# Patient Record
Sex: FEMALE | Race: WHITE | NOT HISPANIC OR LATINO | Employment: PART TIME | URBAN - METROPOLITAN AREA
[De-identification: names, ages, dates, MRNs, and addresses within clinical notes are randomized per-mention and may not be internally consistent; named-entity substitution may affect disease eponyms.]

---

## 2017-12-04 ENCOUNTER — OFFICE VISIT (OUTPATIENT)
Dept: OBSTETRICS AND GYNECOLOGY | Facility: CLINIC | Age: 47
End: 2017-12-04

## 2017-12-04 VITALS
BODY MASS INDEX: 32.65 KG/M2 | WEIGHT: 208 LBS | HEIGHT: 67 IN | DIASTOLIC BLOOD PRESSURE: 78 MMHG | SYSTOLIC BLOOD PRESSURE: 120 MMHG

## 2017-12-04 DIAGNOSIS — Z01.419 ENCOUNTER FOR GYNECOLOGICAL EXAMINATION WITHOUT ABNORMAL FINDING: Primary | ICD-10-CM

## 2017-12-04 PROCEDURE — 99396 PREV VISIT EST AGE 40-64: CPT | Performed by: OBSTETRICS & GYNECOLOGY

## 2017-12-04 RX ORDER — ATORVASTATIN CALCIUM 10 MG/1
TABLET, FILM COATED ORAL
Refills: 3 | COMMUNITY
Start: 2017-11-21 | End: 2022-05-03

## 2017-12-04 RX ORDER — METOPROLOL SUCCINATE 25 MG/1
25 TABLET, EXTENDED RELEASE ORAL
COMMUNITY
Start: 2017-11-30 | End: 2018-11-30

## 2017-12-04 NOTE — PROGRESS NOTES
Subjective     Johanne More is a 47 y.o. female for annual gyn exam    History of Present Illness   47-year-old white female  2 para 2 presents for annual exam.  Her menstrual cycles are regular.  She uses her 's vasectomy for contraception.  Denies menopausal symptoms.  Her diabetes mellitus is well controlled with an insulin pump.  She received a mammogram in the office today.  She has no complaints.      The following portions of the patient's history were reviewed and updated as appropriate: allergies, current medications, past family history, past medical history, past social history, past surgical history and problem list.      Review of Systems   Constitutional: Negative for activity change, appetite change, fatigue and unexpected weight change.   HENT: Negative.    Eyes: Negative.    Respiratory: Negative.    Cardiovascular: Negative.    Gastrointestinal: Negative for abdominal distention, abdominal pain, anal bleeding, constipation, diarrhea, nausea and vomiting.   Endocrine: Negative for cold intolerance, heat intolerance, polydipsia, polyphagia and polyuria.   Genitourinary: Negative for difficulty urinating, dysuria, flank pain, frequency, hematuria and urgency.   Musculoskeletal: Negative.    Skin: Negative.    Allergic/Immunologic: Negative.    Neurological: Negative.    Hematological: Negative.    Psychiatric/Behavioral: Negative.        Objective     Physical Exam   Constitutional: She is oriented to person, place, and time. Vital signs are normal. She appears well-developed and well-nourished. She is cooperative.   HENT:   Head: Normocephalic.   Eyes: Conjunctivae are normal. Pupils are equal, round, and reactive to light.   Neck: Normal range of motion. Neck supple. No tracheal deviation present. No thyromegaly present.   Cardiovascular: Normal rate, regular rhythm and normal heart sounds.  Exam reveals no gallop and no friction rub.    No murmur heard.  Pulmonary/Chest: Effort  normal and breath sounds normal. No respiratory distress. She has no wheezes. Right breast exhibits no inverted nipple, no mass, no nipple discharge, no skin change and no tenderness. Left breast exhibits no inverted nipple, no mass, no nipple discharge, no skin change and no tenderness. Breasts are symmetrical. There is no breast swelling.   Abdominal: Soft. Bowel sounds are normal. She exhibits no distension, no ascites and no mass. There is no hepatosplenomegaly. There is no tenderness. There is no rebound, no guarding and no CVA tenderness. No hernia. Hernia confirmed negative in the right inguinal area and confirmed negative in the left inguinal area.   Genitourinary: Rectal exam shows no fissure, no mass, no tenderness and anal tone normal. Pelvic exam was performed with patient supine. No labial fusion. There is no rash, tenderness, lesion or injury on the right labia. There is no rash, tenderness, lesion or injury on the left labia. Uterus is not deviated, not enlarged, not fixed and not tender. Cervix exhibits no motion tenderness, no discharge and no friability. Right adnexum displays no mass, no tenderness and no fullness. Left adnexum displays no mass, no tenderness and no fullness. There is bleeding in the vagina. No erythema or tenderness in the vagina. No foreign body in the vagina. No signs of injury around the vagina. No vaginal discharge found.   Musculoskeletal: Normal range of motion. She exhibits no edema or deformity.   Lymphadenopathy:     She has no cervical adenopathy.        Right: No inguinal adenopathy present.        Left: No inguinal adenopathy present.   Neurological: She is alert and oriented to person, place, and time. She has normal reflexes. No cranial nerve deficit. Coordination normal.   Skin: Skin is warm and dry. No rash noted. No erythema.   Psychiatric: She has a normal mood and affect. Her behavior is normal.         Johanne was seen today for gynecologic exam.    Diagnoses  and all orders for this visit:    Encounter for gynecological examination without abnormal finding  -     Pap IG, Rfx HPV ASCU - ThinPrep Vial, Cervix    The patient is doing well.  We discussed signs and symptoms of the menopause.  We discussed nutritional supplementation and weightbearing exercise for bone health.  Annual exams were recommended.

## 2017-12-06 LAB
CONV .: NORMAL
CYTOLOGIST CVX/VAG CYTO: NORMAL
CYTOLOGY CVX/VAG DOC THIN PREP: NORMAL
DX ICD CODE: NORMAL
HIV 1 & 2 AB SER-IMP: NORMAL
OTHER STN SPEC: NORMAL
PATH REPORT.FINAL DX SPEC: NORMAL
STAT OF ADQ CVX/VAG CYTO-IMP: NORMAL

## 2018-12-19 ENCOUNTER — OFFICE VISIT (OUTPATIENT)
Dept: OBSTETRICS AND GYNECOLOGY | Facility: CLINIC | Age: 48
End: 2018-12-19

## 2018-12-19 VITALS
BODY MASS INDEX: 34.37 KG/M2 | HEIGHT: 67 IN | SYSTOLIC BLOOD PRESSURE: 100 MMHG | DIASTOLIC BLOOD PRESSURE: 70 MMHG | WEIGHT: 219 LBS

## 2018-12-19 DIAGNOSIS — Z01.419 ENCOUNTER FOR GYNECOLOGICAL EXAMINATION WITHOUT ABNORMAL FINDING: Primary | ICD-10-CM

## 2018-12-19 PROCEDURE — 99396 PREV VISIT EST AGE 40-64: CPT | Performed by: OBSTETRICS & GYNECOLOGY

## 2018-12-19 RX ORDER — TRETINOIN 10 MG/1
CAPSULE ORAL 2 TIMES DAILY
COMMUNITY
End: 2020-07-29

## 2018-12-19 NOTE — PROGRESS NOTES
Subjective    Johanne More is a 48 y.o. female for annual gyn exam    History of Present Illness   48-year-old multiparous white female presents for routine gynecologic exam.  Her menstrual cycles have become somewhat irregular.  The last 4 cycles were 3 weeks apart and then she had prolonged amenorrhea and started bleeding again yesterday.  She denies vasomotor symptoms.  She uses her 's vasectomy for contraception.  She has yet to obtain a screening mammogram this year.  Her thyroid function was borderline low at last check but she has otherwise been asymptomatic.      The following portions of the patient's history were reviewed and updated as appropriate: allergies, current medications, past family history, past medical history, past social history, past surgical history and problem list.      Review of Systems   Constitutional: Negative for activity change, appetite change, fatigue and unexpected weight change.   HENT: Negative.    Eyes: Negative.    Respiratory: Negative.    Cardiovascular: Negative.    Gastrointestinal: Negative for abdominal distention, abdominal pain, anal bleeding, constipation, diarrhea, nausea and vomiting.   Endocrine: Negative for cold intolerance, heat intolerance, polydipsia, polyphagia and polyuria.   Genitourinary: Positive for menstrual problem and vaginal bleeding. Negative for difficulty urinating, dysuria, flank pain, frequency, hematuria, pelvic pain, urgency and vaginal discharge.   Musculoskeletal: Negative.    Skin: Negative.    Allergic/Immunologic: Negative.    Neurological: Negative.    Hematological: Negative.    Psychiatric/Behavioral: Negative.            Physical Exam   Constitutional: She is oriented to person, place, and time. Vital signs are normal. She appears well-developed and well-nourished. She is cooperative.   HENT:   Head: Normocephalic.   Eyes: Conjunctivae are normal. Pupils are equal, round, and reactive to light.   Neck: Normal range of  motion. Neck supple. No tracheal deviation present. No thyromegaly present.   Cardiovascular: Normal rate, regular rhythm and normal heart sounds. Exam reveals no gallop and no friction rub.   No murmur heard.  Pulmonary/Chest: Effort normal and breath sounds normal. No respiratory distress. She has no wheezes. Right breast exhibits no inverted nipple, no mass, no nipple discharge, no skin change and no tenderness. Left breast exhibits no inverted nipple, no mass, no nipple discharge, no skin change and no tenderness. Breasts are symmetrical. There is no breast swelling.   Abdominal: Soft. Bowel sounds are normal. She exhibits no distension, no ascites and no mass. There is no hepatosplenomegaly. There is no tenderness. There is no rebound, no guarding and no CVA tenderness. No hernia. Hernia confirmed negative in the right inguinal area and confirmed negative in the left inguinal area.   Genitourinary: Rectal exam shows no fissure, no mass, no tenderness and anal tone normal. Pelvic exam was performed with patient supine. No labial fusion. There is no rash, tenderness, lesion or injury on the right labia. There is no rash, tenderness, lesion or injury on the left labia. Uterus is not deviated, not enlarged, not fixed and not tender. Cervix exhibits no motion tenderness, no discharge and no friability. Right adnexum displays no mass, no tenderness and no fullness. Left adnexum displays no mass, no tenderness and no fullness. There is bleeding in the vagina. No erythema or tenderness in the vagina. No foreign body in the vagina. No signs of injury around the vagina. No vaginal discharge found.   Musculoskeletal: Normal range of motion. She exhibits no edema or deformity.   Lymphadenopathy:     She has no cervical adenopathy.        Right: No inguinal adenopathy present.        Left: No inguinal adenopathy present.   Neurological: She is alert and oriented to person, place, and time. She has normal reflexes. No  cranial nerve deficit. Coordination normal.   Skin: Skin is warm and dry. No rash noted. No erythema.   Psychiatric: She has a normal mood and affect. Her behavior is normal.         Johanne was seen today for gynecologic exam.    Diagnoses and all orders for this visit:    Encounter for gynecological examination without abnormal finding  -     Pap IG, Rfx HPV ASCU     we discussed her menstrual cycles and its association with her thyroid function.  There is also a possibility of the start of menopause.  We discussed other signs and symptoms of the menopause.  The patient has a follow-up appointment with her endocrinologist regarding her thyroid function.  I encouraged a healthy diet and regular exercise.  She will keep a menstrual calendar and let me know the results of her thyroid function tests.  Annual exams were recommended.  She will schedule a screening mammogram at her convenience.

## 2020-07-23 ENCOUNTER — TELEPHONE (OUTPATIENT)
Dept: OBSTETRICS AND GYNECOLOGY | Facility: CLINIC | Age: 50
End: 2020-07-23

## 2020-07-23 NOTE — TELEPHONE ENCOUNTER
Patient states that she is still bleeding. She is concerned because she seems to be passing more clots than she is used to and would like to know if this is something that she needs to come in to be seen for. Please advise?

## 2020-07-24 NOTE — TELEPHONE ENCOUNTER
Dr. Dhillon, I believe this is being charted on the wrong pt. This patient called about bleeding....please advise?

## 2020-07-24 NOTE — TELEPHONE ENCOUNTER
I discussed the EKG results with the patient and she is totally asymptomatic and denies any adverse cardiac history.  She does have mild hypertension that is controlled with medication.  I recommended we repeat the EKG preoperatively and if there are issues we can have cardiology evaluate.

## 2020-07-27 NOTE — TELEPHONE ENCOUNTER
Should states her menses have become quite irregular and she most recently has been bleeding off and on for 3 weeks.  She denies menopausal symptoms.  She has not been seen since the end of 2018.  I recommend she make an appointment for further evaluation.

## 2020-07-29 ENCOUNTER — OFFICE VISIT (OUTPATIENT)
Dept: OBSTETRICS AND GYNECOLOGY | Facility: CLINIC | Age: 50
End: 2020-07-29

## 2020-07-29 VITALS
SYSTOLIC BLOOD PRESSURE: 100 MMHG | HEIGHT: 67 IN | BODY MASS INDEX: 35 KG/M2 | DIASTOLIC BLOOD PRESSURE: 66 MMHG | WEIGHT: 223 LBS

## 2020-07-29 DIAGNOSIS — N93.8 DUB (DYSFUNCTIONAL UTERINE BLEEDING): Primary | ICD-10-CM

## 2020-07-29 PROCEDURE — 99213 OFFICE O/P EST LOW 20 MIN: CPT | Performed by: OBSTETRICS & GYNECOLOGY

## 2020-07-29 RX ORDER — MONTELUKAST SODIUM 10 MG/1
10 TABLET ORAL DAILY
COMMUNITY
Start: 2020-07-10

## 2020-07-29 RX ORDER — INSULIN GLARGINE 100 [IU]/ML
10 INJECTION, SOLUTION SUBCUTANEOUS
COMMUNITY

## 2020-07-29 RX ORDER — AZELASTINE 1 MG/ML
2 SPRAY, METERED NASAL
COMMUNITY
Start: 2016-02-04 | End: 2022-05-03

## 2020-07-29 RX ORDER — METOPROLOL SUCCINATE 25 MG/1
25 TABLET, EXTENDED RELEASE ORAL DAILY
COMMUNITY
Start: 2020-05-26 | End: 2021-05-26

## 2020-07-29 RX ORDER — GABAPENTIN 300 MG/1
CAPSULE ORAL
COMMUNITY
Start: 2020-04-16 | End: 2022-05-03

## 2020-07-29 RX ORDER — CETIRIZINE HYDROCHLORIDE 10 MG/1
10 TABLET ORAL DAILY
COMMUNITY
Start: 2019-12-22 | End: 2020-12-21

## 2020-07-29 RX ORDER — BUSPIRONE HYDROCHLORIDE 10 MG/1
10 TABLET ORAL
COMMUNITY
Start: 2020-07-10 | End: 2021-07-10

## 2020-07-29 NOTE — PROGRESS NOTES
Subjective   Johanne More is a 50 y.o. female with persistent vaginal bleeding    History of Present Illness    50-year-old multiparous white female presents with 3 weeks of persistent vaginal bleeding.  Her last normal menstrual period was in April.  She had amenorrhea until the beginning of July at which time she had what appeared to be a normal menstrual cycle.  She denies vasomotor symptoms.  The normal bleeding lasted 7 days.  She then had intermittent spotting with occasional heavy vaginal bleeding that has persisted until yesterday.  She denies any pain or passage of tissue or clots.  She recently had her thyroid function tested and it was well controlled.    The following portions of the patient's history were reviewed and updated as appropriate: allergies, current medications, past family history, past medical history, past social history, past surgical history and problem list.    Review of Systems   Genitourinary: Positive for menstrual problem and vaginal bleeding. Negative for pelvic pain and vaginal discharge.       Objective   Physical Exam   The patient is alert and conversant and in no acute distress.  The abdomen is soft, obese and nontender.  No masses are palpable.  The vulva and perineum are without lesion.  The vagina is without bleeding or discharge.  The cervix is without lesion and nontender to motion.  The uterus is anteverted and normal size, shape, and contour.  The adnexa are without mass or tenderness.    Assessment/Plan   Johanne was seen today for follow-up.    Diagnoses and all orders for this visit:    DUB (dysfunctional uterine bleeding)  -     progesterone (PROMETRIUM) 200 MG capsule; Take 1 capsule by mouth Daily for 10 days.    We discussed her symptoms and the findings.  The patient did have a reaction to Megace involving oral blisters.  We discussed the pros and cons of trying oral progesterone and the patient agrees to try the natural progesterone product.  She will keep  a menstrual calendar.  She is overdue for her annual exam and mammogram.  She will schedule these in the near future and return for follow-up.

## 2020-09-30 ENCOUNTER — PROCEDURE VISIT (OUTPATIENT)
Dept: OBSTETRICS AND GYNECOLOGY | Facility: CLINIC | Age: 50
End: 2020-09-30

## 2020-09-30 ENCOUNTER — OFFICE VISIT (OUTPATIENT)
Dept: OBSTETRICS AND GYNECOLOGY | Facility: CLINIC | Age: 50
End: 2020-09-30

## 2020-09-30 ENCOUNTER — APPOINTMENT (OUTPATIENT)
Dept: WOMENS IMAGING | Facility: HOSPITAL | Age: 50
End: 2020-09-30

## 2020-09-30 VITALS
HEIGHT: 67 IN | SYSTOLIC BLOOD PRESSURE: 91 MMHG | WEIGHT: 214 LBS | DIASTOLIC BLOOD PRESSURE: 70 MMHG | BODY MASS INDEX: 33.59 KG/M2

## 2020-09-30 DIAGNOSIS — Z01.419 ENCOUNTER FOR GYNECOLOGICAL EXAMINATION WITHOUT ABNORMAL FINDING: Primary | ICD-10-CM

## 2020-09-30 DIAGNOSIS — Z12.31 VISIT FOR SCREENING MAMMOGRAM: Primary | ICD-10-CM

## 2020-09-30 DIAGNOSIS — B37.2 CANDIDAL DERMATITIS: ICD-10-CM

## 2020-09-30 PROCEDURE — 99396 PREV VISIT EST AGE 40-64: CPT | Performed by: OBSTETRICS & GYNECOLOGY

## 2020-09-30 PROCEDURE — 77063 BREAST TOMOSYNTHESIS BI: CPT | Performed by: RADIOLOGY

## 2020-09-30 PROCEDURE — 77067 SCR MAMMO BI INCL CAD: CPT | Performed by: RADIOLOGY

## 2020-09-30 RX ORDER — SOLIFENACIN SUCCINATE 10 MG/1
TABLET, FILM COATED ORAL
COMMUNITY
Start: 2020-05-26 | End: 2022-05-03

## 2020-09-30 RX ORDER — HYDROXYCHLOROQUINE SULFATE 200 MG/1
TABLET, FILM COATED ORAL
COMMUNITY
Start: 2020-08-11 | End: 2022-05-03

## 2020-09-30 NOTE — PROGRESS NOTES
Subjective    Johanne More is a 50 y.o. female for annual gyn exam    History of Present Illness   50-year-old white female  2 para 2 presents for routine gynecologic exam.  Her menstrual cycles have been fairly regular until July when she had a recurrent episode of dysfunctional uterine bleeding.  This responded to a 10-day course of Provera.  Her last menstrual period last week came on time and was normal and self-limited.  She denies any symptoms of the menopause such as hot flashes or night sweats.  Her diabetes is well controlled.  She received a mammogram in the office today.  There is no family history of breast or colon cancer.  She states she has been actively swimming and has developed itching of the groin.  She has not seen or felt a rash.  She has tried over-the-counter antifungal's with partial relief.  She has no other complaints.      The following portions of the patient's history were reviewed and updated as appropriate: allergies, current medications, past family history, past medical history, past social history, past surgical history and problem list.      Review of Systems   Constitutional: Negative for activity change, appetite change, fatigue and unexpected weight change.   HENT: Negative.    Eyes: Negative.    Respiratory: Negative.    Cardiovascular: Negative.    Gastrointestinal: Negative for abdominal distention, abdominal pain, anal bleeding, constipation, diarrhea, nausea and vomiting.   Endocrine: Negative for cold intolerance, heat intolerance, polydipsia, polyphagia and polyuria.   Genitourinary: Negative for difficulty urinating, dysuria, flank pain, frequency, hematuria, pelvic pain, urgency, vaginal bleeding and vaginal discharge.   Musculoskeletal: Negative.    Skin: Negative.         Itching of both groins   Allergic/Immunologic: Negative.    Neurological: Negative.    Hematological: Negative.    Psychiatric/Behavioral: Negative.            Physical  Exam  Constitutional:       Appearance: She is well-developed.   HENT:      Head: Normocephalic.   Eyes:      Conjunctiva/sclera: Conjunctivae normal.      Pupils: Pupils are equal, round, and reactive to light.   Neck:      Musculoskeletal: Normal range of motion and neck supple.      Thyroid: No thyromegaly.      Trachea: No tracheal deviation.   Cardiovascular:      Rate and Rhythm: Normal rate and regular rhythm.      Heart sounds: Normal heart sounds. No murmur. No friction rub. No gallop.    Pulmonary:      Effort: Pulmonary effort is normal. No respiratory distress.      Breath sounds: Normal breath sounds. No wheezing.   Chest:      Breasts: Breasts are symmetrical.         Right: No inverted nipple, mass, nipple discharge, skin change or tenderness.         Left: No inverted nipple, mass, nipple discharge, skin change or tenderness.   Abdominal:      General: Bowel sounds are normal. There is no distension.      Palpations: Abdomen is soft. There is no mass.      Tenderness: There is no abdominal tenderness. There is no guarding or rebound.      Hernia: No hernia is present. There is no hernia in the left inguinal area.   Genitourinary:     Exam position: Supine.      Labia:         Right: No rash, tenderness, lesion or injury.         Left: No rash, tenderness, lesion or injury.       Vagina: No signs of injury and foreign body. No vaginal discharge, erythema, tenderness or bleeding.      Cervix: No cervical motion tenderness, discharge or friability.      Uterus: Not deviated, not enlarged, not fixed and not tender.       Adnexa:         Right: No mass, tenderness or fullness.          Left: No mass, tenderness or fullness.        Rectum: No mass, tenderness or anal fissure. Normal anal tone.      Comments: There is mild erythema of both groins without rash or lesion.  Musculoskeletal: Normal range of motion.         General: No deformity.   Lymphadenopathy:      Cervical: No cervical adenopathy.    Skin:     General: Skin is warm and dry.      Findings: No erythema or rash.   Neurological:      Mental Status: She is alert and oriented to person, place, and time.      Cranial Nerves: No cranial nerve deficit.      Coordination: Coordination normal.      Deep Tendon Reflexes: Reflexes are normal and symmetric.   Psychiatric:         Behavior: Behavior normal. Behavior is cooperative.           Johanne was seen today for gynecologic exam.    Diagnoses and all orders for this visit:    Encounter for gynecological examination without abnormal finding  -     Pap IG, Rfx HPV ASCU    Candidal dermatitis  -     nystatin-triamcinolone (MYCOLOG II) 120348-3.1 UNIT/GM-% cream; Apply  topically to the appropriate area as directed 2 (Two) Times a Day.    The patient is doing well.  We discussed signs and symptoms of the menopause.  She will try the topical Mytrex for her itching.  I encouraged a healthy diet and regular exercise.  Annual exams and mammograms were recommended.

## 2020-10-05 LAB
CONV .: NORMAL
CYTOLOGIST CVX/VAG CYTO: NORMAL
CYTOLOGY CVX/VAG DOC CYTO: NORMAL
CYTOLOGY CVX/VAG DOC THIN PREP: NORMAL
DX ICD CODE: NORMAL
HIV 1 & 2 AB SER-IMP: NORMAL
OTHER STN SPEC: NORMAL
STAT OF ADQ CVX/VAG CYTO-IMP: NORMAL

## 2021-09-17 ENCOUNTER — OFFICE VISIT (OUTPATIENT)
Dept: OBSTETRICS AND GYNECOLOGY | Facility: CLINIC | Age: 51
End: 2021-09-17

## 2021-09-17 VITALS
BODY MASS INDEX: 30.95 KG/M2 | DIASTOLIC BLOOD PRESSURE: 78 MMHG | SYSTOLIC BLOOD PRESSURE: 138 MMHG | HEIGHT: 67 IN | WEIGHT: 197.2 LBS

## 2021-09-17 DIAGNOSIS — R10.2 VAGINAL PAIN: Primary | ICD-10-CM

## 2021-09-17 DIAGNOSIS — N89.8 VAGINAL DISCHARGE: ICD-10-CM

## 2021-09-17 PROCEDURE — 99213 OFFICE O/P EST LOW 20 MIN: CPT | Performed by: OBSTETRICS & GYNECOLOGY

## 2021-09-17 RX ORDER — CETIRIZINE HYDROCHLORIDE 10 MG/1
10 TABLET ORAL DAILY
COMMUNITY
Start: 2021-08-16

## 2021-09-17 RX ORDER — PEN NEEDLE, DIABETIC 29 G X1/2"
NEEDLE, DISPOSABLE MISCELLANEOUS
COMMUNITY
Start: 2021-06-14

## 2021-09-17 RX ORDER — METOPROLOL SUCCINATE 25 MG/1
25 TABLET, EXTENDED RELEASE ORAL DAILY
COMMUNITY
Start: 2021-08-26

## 2021-09-17 RX ORDER — CYCLOSPORINE 0.5 MG/ML
1 EMULSION OPHTHALMIC
COMMUNITY
Start: 2021-03-25

## 2021-09-20 LAB
A VAGINAE DNA VAG QL NAA+PROBE: NORMAL SCORE
BVAB2 DNA VAG QL NAA+PROBE: NORMAL SCORE
C ALBICANS DNA VAG QL NAA+PROBE: NEGATIVE
C GLABRATA DNA VAG QL NAA+PROBE: NEGATIVE
MEGA1 DNA VAG QL NAA+PROBE: NORMAL SCORE

## 2021-10-04 ENCOUNTER — APPOINTMENT (OUTPATIENT)
Dept: WOMENS IMAGING | Facility: HOSPITAL | Age: 51
End: 2021-10-04

## 2021-10-04 ENCOUNTER — OFFICE VISIT (OUTPATIENT)
Dept: OBSTETRICS AND GYNECOLOGY | Facility: CLINIC | Age: 51
End: 2021-10-04

## 2021-10-04 ENCOUNTER — PROCEDURE VISIT (OUTPATIENT)
Dept: OBSTETRICS AND GYNECOLOGY | Facility: CLINIC | Age: 51
End: 2021-10-04

## 2021-10-04 VITALS
DIASTOLIC BLOOD PRESSURE: 72 MMHG | BODY MASS INDEX: 31.55 KG/M2 | SYSTOLIC BLOOD PRESSURE: 140 MMHG | HEIGHT: 67 IN | WEIGHT: 201 LBS

## 2021-10-04 DIAGNOSIS — Z12.31 VISIT FOR SCREENING MAMMOGRAM: Primary | ICD-10-CM

## 2021-10-04 DIAGNOSIS — Z01.419 ENCOUNTER FOR ANNUAL ROUTINE GYNECOLOGICAL EXAMINATION: Primary | ICD-10-CM

## 2021-10-04 PROCEDURE — 77067 SCR MAMMO BI INCL CAD: CPT | Performed by: OBSTETRICS & GYNECOLOGY

## 2021-10-04 PROCEDURE — 99396 PREV VISIT EST AGE 40-64: CPT | Performed by: OBSTETRICS & GYNECOLOGY

## 2021-10-04 PROCEDURE — 77063 BREAST TOMOSYNTHESIS BI: CPT | Performed by: OBSTETRICS & GYNECOLOGY

## 2021-10-04 PROCEDURE — 77063 BREAST TOMOSYNTHESIS BI: CPT | Performed by: RADIOLOGY

## 2021-10-04 PROCEDURE — 77067 SCR MAMMO BI INCL CAD: CPT | Performed by: RADIOLOGY

## 2021-10-04 NOTE — PROGRESS NOTES
"GYN Annual Exam     CC- Here for annual exam.     Johanne More is a 51 y.o. female who presents for annual well woman exam. Periods are irregular, lasting several days. Dysmenorrhea:none. Cyclic symptoms include none. No intermenstrual bleeding, spotting, or discharge.  She states she does feel some irritation at the urethral meatus but no pain.  She had no unscheduled bleeding.    OB History        2    Para   2    Term   2            AB        Living           SAB        TAB        Ectopic        Molar        Multiple        Live Births                    Current contraception: none  History of abnormal Pap smear: no  Family history of uterine, colon or ovarian cancer: no  History of abnormal mammogram: no  Family history of breast cancer: no  Last Pap : 1 year ago    Past Medical History:   Diagnosis Date   • Asthma    • Diabetic acetonemia (HCC)    • Thyroid disease        Past Surgical History:   Procedure Laterality Date   •  SECTION     • COMBINED KIDNEY-PANCREAS TRANSPLANT     • EXPLORATORY LAPAROTOMY           Current Outpatient Medications:   •  atorvastatin (LIPITOR) 10 MG tablet, TK 1 T PO D UTD, Disp: , Rfl: 3  •  azelastine (ASTELIN) 0.1 % nasal spray, 2 sprays into the nostril(s) as directed by provider., Disp: , Rfl:   •  BD Insulin Syringe U/F 31G X \" 0.3 ML misc, USE WITH PUMP FAILURE, Disp: , Rfl:   •  cetirizine (zyrTEC) 10 MG tablet, Take 10 mg by mouth Daily., Disp: , Rfl:   •  Cholecalciferol (VITAMIN D) 1000 units tablet, Take 4,000 Units by mouth., Disp: , Rfl:   •  cycloSPORINE (RESTASIS) 0.05 % ophthalmic emulsion, Inject 1 drop into the eye., Disp: , Rfl:   •  fluticasone (FLONASE) 50 MCG/ACT nasal spray, 1 spray into each nostril., Disp: , Rfl:   •  gabapentin (NEURONTIN) 300 MG capsule, TK 1 C PO HS., Disp: , Rfl:   •  Glucagon (Baqsimi One Pack) 3 MG/DOSE powder, U UTD WHEN HAVING LOW BLOOD GLUCOSE, Disp: , Rfl:   •  glucose blood test strip, CHECK BLOOD " "SUGAR 5 TIMES DAILY, Disp: , Rfl:   •  hydroxychloroquine (PLAQUENIL) 200 MG tablet, , Disp: , Rfl:   •  insulin aspart (NOVOLOG) 100 UNIT/ML injection, , Disp: , Rfl:   •  insulin glargine (LANTUS) 100 UNIT/ML injection, Inject 10 Units under the skin into the appropriate area as directed., Disp: , Rfl:   •  levothyroxine (SYNTHROID, LEVOTHROID) 75 MCG tablet, TK 1 T PO D, Disp: , Rfl:   •  metoprolol succinate XL (TOPROL-XL) 25 MG 24 hr tablet, Take 25 mg by mouth Daily., Disp: , Rfl:   •  montelukast (SINGULAIR) 10 MG tablet, Take 10 mg by mouth Daily., Disp: , Rfl:   •  nystatin-triamcinolone (MYCOLOG II) 080471-5.1 UNIT/GM-% cream, Apply  topically to the appropriate area as directed 2 (Two) Times a Day., Disp: 60 g, Rfl: 1  •  solifenacin (VESICARE) 10 MG tablet, TAKE 1 TABLET BY MOUTH EVERY DAY, Disp: , Rfl:     Allergies   Allergen Reactions   • Dronabinol    • Megestrol    • Metoclopramide    • Sulfa Antibiotics Nausea And Vomiting     Other reaction(s): Nausea And Vomiting       Social History     Tobacco Use   • Smoking status: Never Smoker   • Smokeless tobacco: Never Used   Vaping Use   • Vaping Use: Never assessed   Substance Use Topics   • Alcohol use: Yes     Comment: occ   • Drug use: No       History reviewed. No pertinent family history.    Review of Systems   Constitutional: Negative for fatigue and fever.   Respiratory: Negative for cough.    Genitourinary: Negative for pelvic pain.       /72   Ht 170.2 cm (67.01\")   Wt 91.2 kg (201 lb)   LMP 09/06/2021   BMI 31.47 kg/m²     Physical Exam  Constitutional:       Appearance: She is normal weight.   Genitourinary:      Pelvic exam was performed with patient in the lithotomy position.      Vulva, inguinal canal, urethra, bladder, vagina, cervix and uterus normal.      Uterus is mobile.      Uterus is midaxial and regular.      No right or left adnexal mass present.      Right adnexa not tender.      Left adnexa not tender.      " Genitourinary Comments: Area noted 2 weeks ago lateral to the urethral meatus is now completely healed.   Neck:      Thyroid: No thyroid mass or thyromegaly.   Chest:      Breasts:         Right: No mass, nipple discharge, skin change or tenderness.         Left: No mass, nipple discharge, skin change or tenderness.   Abdominal:      General: Abdomen is flat. A surgical scar is present.      Palpations: Abdomen is soft. There is no mass.      Tenderness: There is no abdominal tenderness.       Neurological:      Mental Status: She is alert.   Vitals reviewed.               Assessment     1) GYN annual well woman exam.   2) urethral discomfort.  No obvious source or lesions seen.  He does take Vesicare and she may go back and see her urologist.     Plan     1) Breast Health - Clinical breast exam yearly, Discussed [employer] cancer society recommendations for breast cancer screening, and Self breast awareness monthly  2) Pap -done with high risk HPV  3) Smoking status-negative  4) Encouraged to be wary of information obtained via social media and internet based on source and search.  5) Follow up prn and one year.       Paul Bradford MD   10/4/2021  15:29 EDT

## 2021-10-06 LAB
CYTOLOGIST CVX/VAG CYTO: NORMAL
CYTOLOGY CVX/VAG DOC CYTO: NORMAL
CYTOLOGY CVX/VAG DOC THIN PREP: NORMAL
DX ICD CODE: NORMAL
HIV 1 & 2 AB SER-IMP: NORMAL
HPV I/H RISK 4 DNA CVX QL PROBE+SIG AMP: NEGATIVE
OTHER STN SPEC: NORMAL
STAT OF ADQ CVX/VAG CYTO-IMP: NORMAL

## 2021-10-11 ENCOUNTER — TELEPHONE (OUTPATIENT)
Dept: OBSTETRICS AND GYNECOLOGY | Facility: CLINIC | Age: 51
End: 2021-10-11

## 2021-10-11 NOTE — TELEPHONE ENCOUNTER
----- Message from Paul Bradford MD sent at 10/11/2021 12:18 PM EDT -----  Leyda, your mammogram was read as normal

## 2022-05-03 ENCOUNTER — OFFICE VISIT (OUTPATIENT)
Dept: OBSTETRICS AND GYNECOLOGY | Facility: CLINIC | Age: 52
End: 2022-05-03

## 2022-05-03 VITALS — BODY MASS INDEX: 29.6 KG/M2 | HEIGHT: 67 IN | WEIGHT: 188.6 LBS

## 2022-05-03 DIAGNOSIS — N63.13 MASS OF LOWER OUTER QUADRANT OF RIGHT BREAST: Primary | ICD-10-CM

## 2022-05-03 PROCEDURE — 99212 OFFICE O/P EST SF 10 MIN: CPT | Performed by: OBSTETRICS & GYNECOLOGY

## 2022-05-03 RX ORDER — ALBUTEROL SULFATE 90 UG/1
AEROSOL, METERED RESPIRATORY (INHALATION)
COMMUNITY
Start: 2022-04-01

## 2022-05-03 RX ORDER — ONDANSETRON 4 MG/1
TABLET, FILM COATED ORAL
COMMUNITY
Start: 2022-03-21

## 2022-05-03 RX ORDER — VIBEGRON 75 MG/1
1 TABLET, FILM COATED ORAL DAILY
COMMUNITY
Start: 2022-02-21

## 2022-05-03 RX ORDER — LEVOTHYROXINE SODIUM 0.07 MG/1
75 TABLET ORAL DAILY
COMMUNITY
Start: 2022-01-07

## 2022-05-03 NOTE — PROGRESS NOTES
"        REASON FOR FOLLOWUP/CHIEF COMPLAINT: Right breast lump     HISTORY OF PRESENT ILLNESS: Patient is noted a small lump in the right breast in the lower mid to lower outer portion of the right breast.  She thinks she has noted it for about 3 weeks but more so over the past week.  She denies any trauma.  She had normal mammogram about 6 months ago.      Past Medical History, Past Surgical History, Social History, Family History have been reviewed and are without significant changes except as mentioned.    Review of Systems   Review of Systems   Constitutional: Negative for fatigue and fever.   Respiratory: Negative for cough.    Genitourinary: Negative for menstrual problem.       Medications:  The current medication list was reviewed in the EMR    ALLERGIES:    Allergies   Allergen Reactions   • Dronabinol    • Megestrol    • Metoclopramide    • Sulfa Antibiotics Nausea And Vomiting     Other reaction(s): Nausea And Vomiting              Vitals:    05/03/22 1329   Weight: 85.5 kg (188 lb 9.6 oz)   Height: 170.2 cm (67\")     Physical Exam    CONSTITUTIONAL:  Vital signs reviewed.  No distress, looks comfortable.    BREAST: Right breast is evaluated.  There are no skin changes and no significant asymmetry.  No nipple changes.  Small nodular area felt just below the areola at about the 7 o'clock position in the right breast.    PSYCHIATRIC:  Normal judgment and insight.  Normal mood and affect.       RECENT LABS:No results found for: WBC, HGB, PLT    ASSESSMENT/PLAN:  Johanne More Room/bed info not found     Right breast lump.  Recommend scheduling right breast ultrasound.  "

## 2022-05-18 ENCOUNTER — TELEPHONE (OUTPATIENT)
Dept: OBSTETRICS AND GYNECOLOGY | Facility: CLINIC | Age: 52
End: 2022-05-18

## 2022-05-18 NOTE — TELEPHONE ENCOUNTER
Pt called and was informed of appt at [facility] 5/19/22 @ 10am for Breast US.  SR

## 2022-05-23 DIAGNOSIS — N63.13 MASS OF LOWER OUTER QUADRANT OF RIGHT BREAST: Primary | ICD-10-CM

## 2022-06-08 ENCOUNTER — APPOINTMENT (OUTPATIENT)
Dept: WOMENS IMAGING | Facility: HOSPITAL | Age: 52
End: 2022-06-08

## 2022-06-08 ENCOUNTER — TELEPHONE (OUTPATIENT)
Dept: OBSTETRICS AND GYNECOLOGY | Facility: CLINIC | Age: 52
End: 2022-06-08

## 2022-06-08 PROCEDURE — 77065 DX MAMMO INCL CAD UNI: CPT | Performed by: RADIOLOGY

## 2022-06-08 PROCEDURE — G0279 TOMOSYNTHESIS, MAMMO: HCPCS | Performed by: RADIOLOGY

## 2022-06-08 PROCEDURE — 76641 ULTRASOUND BREAST COMPLETE: CPT | Performed by: RADIOLOGY

## 2022-06-08 PROCEDURE — 77061 BREAST TOMOSYNTHESIS UNI: CPT | Performed by: RADIOLOGY

## 2022-06-08 NOTE — TELEPHONE ENCOUNTER
Good Morning,     Patient is calling after her mammo today for the [facility] on [facility] wanted to let provider know she has also found another lump under her armpit as well as several bumps on her upper arms.     She is wondering if provider would like her to come in, or wait until she has received the results for her recent mammo. Please advise    Thank you

## 2022-06-09 ENCOUNTER — TELEPHONE (OUTPATIENT)
Dept: OBSTETRICS AND GYNECOLOGY | Facility: CLINIC | Age: 52
End: 2022-06-09

## 2022-06-09 NOTE — TELEPHONE ENCOUNTER
Pt is calling again anxiously awaiting results of mammo, I have informed her she will receive a call as soon as they are available.

## 2022-06-10 ENCOUNTER — TELEPHONE (OUTPATIENT)
Dept: OBSTETRICS AND GYNECOLOGY | Facility: CLINIC | Age: 52
End: 2022-06-10

## 2022-06-10 DIAGNOSIS — N63.13 MASS OF LOWER OUTER QUADRANT OF RIGHT BREAST: ICD-10-CM

## 2022-06-10 NOTE — TELEPHONE ENCOUNTER
Called patient regarding mammogram and ultrasound results of right breast. I discussed her results that returned as BIRADS-2. The patient reports that since seeing Dr. Bradford, she has noted a lump arise in her axilla that she mentioned during the imaging studies. Ultrasound shows no signs of masses in the axilla. Recommended the patient continue to monitor the masses over the next 6-8 weeks and if she notes worsening or change in size, she is to schedule an evaluation. She was also advised to have a screening mammogram performed in 4 months. All questions and concerns answered.    Yocasta Diaz MD

## 2022-06-10 NOTE — TELEPHONE ENCOUNTER
Call returned, benign results given.   Patient would like to discuss with Dr. Bradford on Monday.

## 2022-10-13 ENCOUNTER — APPOINTMENT (OUTPATIENT)
Dept: WOMENS IMAGING | Facility: HOSPITAL | Age: 52
End: 2022-10-13

## 2022-10-13 ENCOUNTER — OFFICE VISIT (OUTPATIENT)
Dept: OBSTETRICS AND GYNECOLOGY | Facility: CLINIC | Age: 52
End: 2022-10-13

## 2022-10-13 ENCOUNTER — PROCEDURE VISIT (OUTPATIENT)
Dept: OBSTETRICS AND GYNECOLOGY | Facility: CLINIC | Age: 52
End: 2022-10-13

## 2022-10-13 VITALS
HEIGHT: 67 IN | WEIGHT: 196 LBS | BODY MASS INDEX: 30.76 KG/M2 | SYSTOLIC BLOOD PRESSURE: 120 MMHG | DIASTOLIC BLOOD PRESSURE: 70 MMHG

## 2022-10-13 DIAGNOSIS — Z01.419 ENCOUNTER FOR GYNECOLOGICAL EXAMINATION WITHOUT ABNORMAL FINDING: Primary | ICD-10-CM

## 2022-10-13 DIAGNOSIS — Z12.31 VISIT FOR SCREENING MAMMOGRAM: Primary | ICD-10-CM

## 2022-10-13 PROCEDURE — 77067 SCR MAMMO BI INCL CAD: CPT | Performed by: RADIOLOGY

## 2022-10-13 PROCEDURE — 77063 BREAST TOMOSYNTHESIS BI: CPT | Performed by: OBSTETRICS & GYNECOLOGY

## 2022-10-13 PROCEDURE — 77067 SCR MAMMO BI INCL CAD: CPT | Performed by: OBSTETRICS & GYNECOLOGY

## 2022-10-13 PROCEDURE — 99396 PREV VISIT EST AGE 40-64: CPT | Performed by: OBSTETRICS & GYNECOLOGY

## 2022-10-13 PROCEDURE — 77063 BREAST TOMOSYNTHESIS BI: CPT | Performed by: RADIOLOGY

## 2022-10-13 RX ORDER — ACYCLOVIR 400 MG/1
TABLET ORAL
COMMUNITY
Start: 2022-10-08

## 2022-10-13 RX ORDER — PREDNISONE 10 MG/1
TABLET ORAL
COMMUNITY
Start: 2022-10-10

## 2022-10-13 NOTE — PROGRESS NOTES
"GYN Annual Exam     CC- Here for annual exam.     Johanne More is a 52 y.o. female who presents for annual well woman exam. Periods are Absent since 2021, lasting 0 days. Dysmenorrhea:none. Cyclic symptoms include none. No intermenstrual bleeding, spotting, or discharge.  She has noted some hot flashes but has not had very good relief with over-the-counter Estroven.    OB History        2    Para   2    Term   2            AB        Living           SAB        IAB        Ectopic        Molar        Multiple        Live Births                    Current contraception: post menopausal status  History of abnormal Pap smear: no  Family history of uterine, colon or ovarian cancer: no  History of abnormal mammogram: yes - She had breast cysts noted on ultrasound and has mammogram scheduled today for follow-up.  Family history of breast cancer: no  Last Pap :     Past Medical History:   Diagnosis Date   • Asthma    • Diabetic acetonemia (HCC)    • Thyroid disease        Past Surgical History:   Procedure Laterality Date   •  SECTION     • COMBINED KIDNEY-PANCREAS TRANSPLANT     • EXPLORATORY LAPAROTOMY           Current Outpatient Medications:   •  acyclovir (ZOVIRAX) 400 MG tablet, TAKE 1 TABLET BY MOUTH THREE TIMES DAILY FOR 10 DAYS, Disp: , Rfl:   •  albuterol sulfate  (90 Base) MCG/ACT inhaler, INHALE 2 PUFFS INTO THE LUNGS EVERY 4 HOURS AS NEEDED FOR WHEEZING, Disp: , Rfl:   •  BD Insulin Syringe U/F 31G X \" 0.3 ML misc, USE WITH PUMP FAILURE, Disp: , Rfl:   •  cetirizine (zyrTEC) 10 MG tablet, Take 10 mg by mouth Daily., Disp: , Rfl:   •  Cholecalciferol (VITAMIN D) 1000 units tablet, Take 4,000 Units by mouth., Disp: , Rfl:   •  cycloSPORINE (RESTASIS) 0.05 % ophthalmic emulsion, Inject 1 drop into the eye., Disp: , Rfl:   •  Erenumab-aooe (AIMOVIG) 70 MG/ML prefilled syringe, Inject 70 mg under the skin into the appropriate area as directed., Disp: , Rfl:   •  " "fluticasone (FLONASE) 50 MCG/ACT nasal spray, 1 spray into each nostril., Disp: , Rfl:   •  Gemtesa 75 MG tablet, Take 1 tablet by mouth Daily., Disp: , Rfl:   •  Glucagon 3 MG/DOSE powder, U UTD WHEN HAVING LOW BLOOD GLUCOSE, Disp: , Rfl:   •  glucose blood test strip, CHECK BLOOD SUGAR 5 TIMES DAILY, Disp: , Rfl:   •  insulin aspart (novoLOG) 100 UNIT/ML injection, , Disp: , Rfl:   •  insulin glargine (LANTUS) 100 UNIT/ML injection, Inject 10 Units under the skin into the appropriate area as directed., Disp: , Rfl:   •  levothyroxine (SYNTHROID, LEVOTHROID) 75 MCG tablet, Take 75 mcg by mouth Daily., Disp: , Rfl:   •  metoprolol succinate XL (TOPROL-XL) 25 MG 24 hr tablet, Take 25 mg by mouth Daily., Disp: , Rfl:   •  Nutritional Supplements (ESTROVEN PM PO), Take  by mouth., Disp: , Rfl:   •  ondansetron (ZOFRAN) 4 MG tablet, TAKE 1 TABLET BY MOUTH EVERY 8 HOURS AS NEEDED FOR NAUSEA. LESS SEDATING \", Disp: , Rfl:   •  predniSONE (DELTASONE) 10 MG tablet, TAKE 4 TABLETS DAILY FOR 3 DAYS 3 TABLETS DAILY FOR 3 DAYS 2 TABLETS DAILY FOR 3 DAYS 1 TABLET DAILY FOR 3 DAYS, Disp: , Rfl:   •  montelukast (SINGULAIR) 10 MG tablet, Take 10 mg by mouth Daily., Disp: , Rfl:     Allergies   Allergen Reactions   • Dronabinol    • Megestrol    • Metoclopramide    • Sulfa Antibiotics Nausea And Vomiting     Other reaction(s): Nausea And Vomiting  Other reaction(s): Gastric Problems        Social History     Tobacco Use   • Smoking status: Never   • Smokeless tobacco: Never   Substance Use Topics   • Alcohol use: Yes     Comment: occ   • Drug use: No       History reviewed. No pertinent family history.    Review of Systems   Constitutional: Negative for fatigue and fever.   Respiratory: Negative for cough and shortness of breath.    Genitourinary: Positive for breast lump. Negative for pelvic pain and vaginal bleeding.       /70   Ht 170.2 cm (67\")   Wt 88.9 kg (196 lb)   LMP 09/06/2021   BMI 30.70 kg/m²     Physical " Exam  Constitutional:       Appearance: She is normal weight.   Genitourinary:      Bladder and urethral meatus normal.      No lesions in the vagina.      Right Labia: No lesions.     Left Labia: No lesions.     No vaginal tenderness or bleeding.      No vaginal prolapse present.     No vaginal atrophy present.       Right Adnexa: not tender, not full and no mass present.     Left Adnexa: not tender, not full and no mass present.     No cervical motion tenderness, discharge or lesion.      Uterus is not enlarged, fixed or tender.      No uterine mass detected.     Uterus is midaxial.   Breasts:     Right: No mass, nipple discharge, skin change or tenderness.      Left: No mass, nipple discharge, skin change or tenderness.   Neck:      Thyroid: No thyroid mass or thyromegaly.   Abdominal:      General: Abdomen is flat. A surgical scar is present.      Palpations: There is no mass.      Tenderness: There is no abdominal tenderness.       Neurological:      Mental Status: She is alert.   Vitals reviewed.               Assessment     1) GYN annual well woman exam.   2) menopausal state doing well on over-the-counter Estroven.     Plan     1) Breast Health - Clinical breast exam yearly, Discussed American cancer society recommendations for breast cancer screening, and Self breast awareness monthly  2) Pap -not indicated this year  3) Smoking status-negative  4) Encouraged to be wary of information obtained via social media and internet based on source and search.  5) Follow up prn and one year.       Paul Bradford MD   10/13/2022  10:58 EDT

## 2023-10-17 ENCOUNTER — PROCEDURE VISIT (OUTPATIENT)
Dept: OBSTETRICS AND GYNECOLOGY | Facility: CLINIC | Age: 53
End: 2023-10-17
Payer: COMMERCIAL

## 2023-10-17 ENCOUNTER — OFFICE VISIT (OUTPATIENT)
Dept: OBSTETRICS AND GYNECOLOGY | Facility: CLINIC | Age: 53
End: 2023-10-17
Payer: COMMERCIAL

## 2023-10-17 VITALS
DIASTOLIC BLOOD PRESSURE: 70 MMHG | WEIGHT: 196 LBS | SYSTOLIC BLOOD PRESSURE: 124 MMHG | HEIGHT: 67 IN | BODY MASS INDEX: 30.76 KG/M2

## 2023-10-17 DIAGNOSIS — Z01.419 ENCOUNTER FOR GYNECOLOGICAL EXAMINATION WITHOUT ABNORMAL FINDING: Primary | ICD-10-CM

## 2023-10-17 DIAGNOSIS — Z12.31 VISIT FOR SCREENING MAMMOGRAM: Primary | ICD-10-CM

## 2023-10-17 RX ORDER — OMEPRAZOLE 20 MG/1
20 CAPSULE, DELAYED RELEASE ORAL DAILY
COMMUNITY

## 2023-10-17 NOTE — PROGRESS NOTES
GYN Annual Exam     CC- Here for annual exam.  (Patient is here today for her annual exam and mammogram. Last annual was 10/13/22, last pap smear was 10/4/21 Neg/Neg HPV. Last colonoscopy was 7/15/22. )     Johanne More is a 53 y.o. female who presents for annual well woman exam. Periods are  now absent for the last few years , lasting 0 days. Dysmenorrhea:none. Cyclic symptoms include none. No intermenstrual bleeding, spotting, or discharge.    She is menopausal and is no longer needing to take anything over-the-counter.  She has lost close to 50 pounds and is now doing a keto diet and intermittent fasting.  She also states that her mixed incontinence has greatly improved with the weight loss.    OB History          2    Para   2    Term   2            AB        Living             SAB        IAB        Ectopic        Molar        Multiple        Live Births                    Current contraception: post menopausal status  History of abnormal Pap smear: no  Family history of uterine, colon or ovarian cancer: no  History of abnormal mammogram:  Nothing requiring biopsy  Family history of breast cancer: no  Last Pap :     Past Medical History:   Diagnosis Date    Asthma     Diabetic acetonemia     Thyroid disease        Past Surgical History:   Procedure Laterality Date    APPENDECTOMY       SECTION      COMBINED KIDNEY-PANCREAS TRANSPLANT      EXPLORATORY LAPAROTOMY           Current Outpatient Medications:     albuterol sulfate  (90 Base) MCG/ACT inhaler, INHALE 2 PUFFS INTO THE LUNGS EVERY 4 HOURS AS NEEDED FOR WHEEZING, Disp: , Rfl:     Cholecalciferol (VITAMIN D) 1000 units tablet, Take 4 tablets by mouth., Disp: , Rfl:     cycloSPORINE (RESTASIS) 0.05 % ophthalmic emulsion, Inject 1 drop into the eye., Disp: , Rfl:     fluticasone (FLONASE) 50 MCG/ACT nasal spray, 1 spray into the nostril(s) as directed by provider., Disp: , Rfl:     Glucagon 3 MG/DOSE powder, U UTD WHEN  "HAVING LOW BLOOD GLUCOSE, Disp: , Rfl:     glucose blood test strip, CHECK BLOOD SUGAR 5 TIMES DAILY, Disp: , Rfl:     insulin aspart (novoLOG) 100 UNIT/ML injection, , Disp: , Rfl:     insulin glargine (LANTUS) 100 UNIT/ML injection, Inject 10 Units under the skin into the appropriate area as directed., Disp: , Rfl:     levothyroxine (SYNTHROID, LEVOTHROID) 75 MCG tablet, Take 1 tablet by mouth Daily., Disp: , Rfl:     metoprolol succinate XL (TOPROL-XL) 25 MG 24 hr tablet, Take 1 tablet by mouth Daily., Disp: , Rfl:     ondansetron (ZOFRAN) 4 MG tablet, TAKE 1 TABLET BY MOUTH EVERY 8 HOURS AS NEEDED FOR NAUSEA. LESS SEDATING \", Disp: , Rfl:     BD Insulin Syringe U/F 31G X 5/16\" 0.3 ML misc, USE WITH PUMP FAILURE (Patient not taking: Reported on 10/17/2023), Disp: , Rfl:     cetirizine (zyrTEC) 10 MG tablet, Take 10 mg by mouth Daily. (Patient not taking: Reported on 10/17/2023), Disp: , Rfl:     co-enzyme Q-10 30 MG capsule, Take 200 mg by mouth Daily., Disp: , Rfl:     Erenumab-aooe (AIMOVIG) 70 MG/ML prefilled syringe, Inject 70 mg under the skin into the appropriate area as directed. (Patient not taking: Reported on 10/17/2023), Disp: , Rfl:     Gemtesa 75 MG tablet, Take 1 tablet by mouth Daily. (Patient not taking: Reported on 10/17/2023), Disp: , Rfl:     montelukast (SINGULAIR) 10 MG tablet, Take 10 mg by mouth Daily. (Patient not taking: Reported on 10/17/2023), Disp: , Rfl:     Nutritional Supplements (ESTROVEN PM PO), Take  by mouth. (Patient not taking: Reported on 10/17/2023), Disp: , Rfl:     omeprazole (priLOSEC) 20 MG capsule, Take 1 capsule by mouth Daily., Disp: , Rfl:     Allergies   Allergen Reactions    Dronabinol     Megestrol     Metoclopramide     Sulfa Antibiotics Nausea And Vomiting     Other reaction(s): Nausea And Vomiting    Other reaction(s): Gastric Problems       Social History     Tobacco Use    Smoking status: Never    Smokeless tobacco: Never   Vaping Use    Vaping Use: Never " "used   Substance Use Topics    Alcohol use: Yes     Comment: occ    Drug use: No       Family History   Problem Relation Age of Onset    Breast cancer Neg Hx     Ovarian cancer Neg Hx     Uterine cancer Neg Hx     Colon cancer Neg Hx        Review of Systems   Constitutional:  Negative for fatigue and fever.   Respiratory:  Negative for cough.    Genitourinary:  Negative for urgency, urinary incontinence and vaginal bleeding.       /70   Ht 170.2 cm (67\")   Wt 88.9 kg (196 lb)   LMP 09/06/2021   BMI 30.70 kg/m²     Physical Exam  Constitutional:       Appearance: She is normal weight.   Genitourinary:      Bladder and urethral meatus normal.      No lesions in the vagina.      Right Labia: No lesions.     Left Labia: No lesions.     No vaginal discharge, tenderness or bleeding.      No vaginal prolapse present.     No vaginal atrophy present.       Right Adnexa: not tender, not full and no mass present.     Left Adnexa: not tender, not full and no mass present.     No cervical motion tenderness, discharge, friability or lesion.      Uterus is not enlarged, fixed or tender.      No uterine mass detected.     Uterus is midaxial.   Breasts:     Right: No mass, nipple discharge, skin change or tenderness.      Left: No mass, nipple discharge, skin change or tenderness.   Neck:      Thyroid: No thyroid mass or thyromegaly.   Abdominal:      General: Abdomen is flat.      Palpations: Abdomen is soft. There is no mass.      Tenderness: There is no abdominal tenderness.   Neurological:      Mental Status: She is alert.   Vitals reviewed.               Assessment     1) GYN annual well woman exam.   2) normal GYN annual visit today.  We will check mammogram and notify her of results.     Plan     1) Breast Health - Clinical breast exam yearly, Discussed American cancer society recommendations for breast cancer screening, and Self breast awareness monthly  2) Pap -this will be due again next year.  3) Smoking " status-negative  4) Encouraged to be wary of information obtained via social media and internet based on source and search.  5) Follow up prn and one year.       Paul Bradford MD   10/17/2023  09:14 EDT